# Patient Record
Sex: MALE | Race: BLACK OR AFRICAN AMERICAN | Employment: UNEMPLOYED | URBAN - METROPOLITAN AREA
[De-identification: names, ages, dates, MRNs, and addresses within clinical notes are randomized per-mention and may not be internally consistent; named-entity substitution may affect disease eponyms.]

---

## 2021-06-16 ENCOUNTER — HOSPITAL ENCOUNTER (EMERGENCY)
Facility: HOSPITAL | Age: 21
Discharge: HOME | End: 2021-06-16
Payer: COMMERCIAL

## 2021-06-16 VITALS
BODY MASS INDEX: 20.32 KG/M2 | DIASTOLIC BLOOD PRESSURE: 68 MMHG | TEMPERATURE: 98 F | HEIGHT: 72 IN | HEART RATE: 59 BPM | OXYGEN SATURATION: 98 % | SYSTOLIC BLOOD PRESSURE: 106 MMHG | WEIGHT: 150 LBS | RESPIRATION RATE: 16 BRPM

## 2021-06-16 DIAGNOSIS — S71.151A DOG BITE OF RIGHT THIGH, INITIAL ENCOUNTER: Primary | ICD-10-CM
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
## 2021-06-16 PROCEDURE — 99283 EMERGENCY DEPT VISIT LOW MDM: CPT

## 2021-06-16 RX ORDER — AMOXICILLIN AND CLAVULANATE POTASSIUM 875; 125 MG/1; MG/1
875 TABLET, FILM COATED ORAL ONCE
Status: COMPLETED | OUTPATIENT
Start: 2021-06-16 | End: 2021-06-16

## 2021-06-16 RX ORDER — AMOXICILLIN AND CLAVULANATE POTASSIUM 875; 125 MG/1; MG/1
1 TABLET, FILM COATED ORAL EVERY 12 HOURS
Qty: 14 TABLET | Refills: 0 | Status: SHIPPED | OUTPATIENT
Start: 2021-06-16 | End: 2021-06-23

## 2021-06-17 NOTE — ED PROVIDER NOTES
Patient Seen in: [facility] AND [facility] Emergency Department      History   Patient presents with:  Bite    Stated Complaint: dog bite    HPI/Subjective:   HPI    51-year-old male presents the emergency department for evaluation.   Patient states that just aiden Effort: Pulmonary effort is normal.   Musculoskeletal:         General: Normal range of motion. Cervical back: Normal range of motion and neck supple. Legs:    Skin:     General: Skin is warm and dry.       Capillary Refill: Capillary refill carina

## 2021-06-17 NOTE — ED QUICK NOTES
Assumed care of patient from triage. Patient to ED from home for dog bite. Patient states that he was bit by a pitbull, unknown if up to date on vaccinations. Patient has small open area of skin in the shape of a bite marilee, no bleeding noted.  Wound asepsis

## 2021-06-17 NOTE — ED QUICK NOTES
Patient given dc instructions. Educated on when to follow up or return if symptoms worsen. Patient expressed a verbal understanding of all dc instructions.

## 2021-06-17 NOTE — ED INITIAL ASSESSMENT (HPI)
PT states he was bit by dog to right leg appx 45 minutes ago by someone else's dog. Bleeding controlled, unaware of dogs vaccination status.

## 2024-05-06 ENCOUNTER — APPOINTMENT (OUTPATIENT)
Dept: GENERAL RADIOLOGY | Facility: HOSPITAL | Age: 24
End: 2024-05-06
Attending: EMERGENCY MEDICINE
Payer: COMMERCIAL

## 2024-05-06 ENCOUNTER — HOSPITAL ENCOUNTER (EMERGENCY)
Facility: HOSPITAL | Age: 24
Discharge: HOME | End: 2024-05-07
Attending: EMERGENCY MEDICINE
Payer: COMMERCIAL

## 2024-05-06 DIAGNOSIS — S60.052A CONTUSION OF LEFT LITTLE FINGER WITHOUT DAMAGE TO NAIL, INITIAL ENCOUNTER: Primary | ICD-10-CM

## 2024-05-06 DIAGNOSIS — S61.217A LACERATION OF LEFT LITTLE FINGER WITHOUT FOREIGN BODY WITHOUT DAMAGE TO NAIL, INITIAL ENCOUNTER: ICD-10-CM

## 2024-05-06 PROCEDURE — 99283 EMERGENCY DEPT VISIT LOW MDM: CPT

## 2024-05-06 PROCEDURE — 12001 RPR S/N/AX/GEN/TRNK 2.5CM/<: CPT

## 2024-05-06 PROCEDURE — 99284 EMERGENCY DEPT VISIT MOD MDM: CPT

## 2024-05-06 PROCEDURE — 73140 X-RAY EXAM OF FINGER(S): CPT | Performed by: EMERGENCY MEDICINE

## 2024-05-06 RX ORDER — HYDROCODONE BITARTRATE AND ACETAMINOPHEN 5; 325 MG/1; MG/1
2 TABLET ORAL ONCE
Status: COMPLETED | OUTPATIENT
Start: 2024-05-06 | End: 2024-05-06

## 2024-05-07 VITALS
DIASTOLIC BLOOD PRESSURE: 66 MMHG | OXYGEN SATURATION: 98 % | TEMPERATURE: 98 F | WEIGHT: 150 LBS | SYSTOLIC BLOOD PRESSURE: 108 MMHG | RESPIRATION RATE: 18 BRPM | HEART RATE: 82 BPM | BODY MASS INDEX: 20 KG/M2

## 2024-05-07 NOTE — ED PROVIDER NOTES
Patient Seen in: [facility] Emergency Department      History   No chief complaint on file.    Stated Complaint: L finger laceration    Subjective:   The history is provided by the patient.       23 year old male otherwise healthy presenting with L pinky finger pain after he accidentally slammed his finger in the car door just pta. He reports nail is intact but he has lacerations and pain throughout the finger.     Objective:   History reviewed. No pertinent past medical history.           History reviewed. No pertinent surgical history.             Social History     Socioeconomic History    Marital status: Single   Tobacco Use    Smoking status: Never    Smokeless tobacco: Never   Vaping Use    Vaping status: Never Used   Substance and Sexual Activity    Alcohol use: Not Currently    Drug use: Yes     Types: Cannabis              Review of Systems    Positive for stated complaint: L finger laceration  Other systems are as noted in HPI.  Constitutional and vital signs reviewed.      All other systems reviewed and negative except as noted above.    Physical Exam     ED Triage Vitals [05/06/24 0085]   /78   Pulse 81   Resp 18   Temp 98.2 °F (36.8 °C)   Temp src Oral   SpO2 98 %   O2 Device None (Room air)       Current:/78   Pulse 81   Temp 98.2 °F (36.8 °C) (Oral)   Resp 18   Wt 68 kg   SpO2 98%   BMI 20.34 kg/m²         Physical Exam  Vitals and nursing note reviewed.   Constitutional:       General: He is not in acute distress.     Appearance: He is well-developed. He is not ill-appearing or diaphoretic.   HENT:      Head: Normocephalic and atraumatic.   Eyes:      Conjunctiva/sclera: Conjunctivae normal.      Pupils: Pupils are equal, round, and reactive to light.   Cardiovascular:      Rate and Rhythm: Normal rate.   Pulmonary:      Effort: Pulmonary effort is normal. No respiratory distress.   Musculoskeletal:         General: No deformity. Normal range of motion.      Left hand:  Tenderness (distal phalanx and middle phalanx) present. Normal range of motion. Normal strength. Normal sensation. Normal capillary refill.      Cervical back: Normal range of motion and neck supple.      Comments: L pinky finger - some bruising to middle phalanx with laceration 1.5 cm. No deep structures visible. Pt has full extension and flexion. No nail bed involvement.    Skin:     General: Skin is warm and dry.      Findings: No rash.   Neurological:      Mental Status: He is alert and oriented to person, place, and time.      Coordination: Coordination normal.   Psychiatric:         Attention and Perception: Attention normal.         Mood and Affect: Mood normal.         Behavior: Behavior normal. Behavior is cooperative.             ED Course   Labs Reviewed - No data to display       ED Course as of 05/07/24 0109  ------------------------------------------------------------  Time: 05/07 0050  Value: XR FINGER(S) (MIN 2 VIEWS), LEFT 5TH (CPT=73140)  Comment: X-ray left fifth  IMPRESSION:  No acute fracture or malalignment.                MDM      Laceration repair:    Verbal consent was obtained from the patient.  The 1.5 cm laceration located L distal pinky finger was anesthetized in the usual fashion. The wound was scrubbed, draped and explored.   There were no deep structures involved.  No tendon injury was identified.  The wound was repaired with 5-0 nylon x4 .  The wound repair was simple.  The procedure was performed by myself.         Medical Decision Making      Disposition and Plan     Clinical Impression:  1. Contusion of left little finger without damage to nail, initial encounter    2. Laceration of left little finger without foreign body without damage to nail, initial encounter         Disposition:  Discharge  5/7/2024  1:08 am    Follow-up:  Your primary care doctor or with any immediate care or ER    Go in 5 day(s)  In 5-7 days have the stitches taken out          Medications  Prescribed:  There are no discharge medications for this patient.

## (undated) NOTE — LETTER
Date & Time: 5/7/2024, 1:22 AM  Patient: Nilay Vargas  Encounter Provider(s):    Ann Hutton MD       To Whom It May Concern:    Nilay Vargas was seen and treated in our department on 5/6/2024. He can return to work with these limitations: wear finger splint  .    If you have any questions or concerns, please do not hesitate to call.        _____________________________  Physician/APC Signature